# Patient Record
Sex: MALE | Race: WHITE | NOT HISPANIC OR LATINO | Employment: STUDENT | URBAN - METROPOLITAN AREA
[De-identification: names, ages, dates, MRNs, and addresses within clinical notes are randomized per-mention and may not be internally consistent; named-entity substitution may affect disease eponyms.]

---
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
## 2025-06-27 ENCOUNTER — OFFICE VISIT (OUTPATIENT)
Dept: FAMILY MEDICINE CLINIC | Facility: CLINIC | Age: 25
End: 2025-06-27
Payer: COMMERCIAL

## 2025-06-27 VITALS
BODY MASS INDEX: 26.2 KG/M2 | OXYGEN SATURATION: 98 % | TEMPERATURE: 97.7 F | WEIGHT: 176.9 LBS | HEART RATE: 99 BPM | SYSTOLIC BLOOD PRESSURE: 124 MMHG | DIASTOLIC BLOOD PRESSURE: 78 MMHG | HEIGHT: 69 IN

## 2025-06-27 DIAGNOSIS — Z00.00 ROUTINE PHYSICAL EXAMINATION: Primary | ICD-10-CM

## 2025-06-27 DIAGNOSIS — E06.3 HASHIMOTO'S THYROIDITIS: ICD-10-CM

## 2025-06-27 DIAGNOSIS — R00.2 PALPITATIONS: ICD-10-CM

## 2025-06-27 DIAGNOSIS — Z11.59 NEED FOR HEPATITIS C SCREENING TEST: ICD-10-CM

## 2025-06-27 DIAGNOSIS — Z23 NEED FOR VACCINATION: ICD-10-CM

## 2025-06-27 RX ORDER — KETOCONAZOLE 20 MG/ML
SHAMPOO, SUSPENSION TOPICAL
COMMUNITY
Start: 2025-06-13

## 2025-06-27 NOTE — PROGRESS NOTES
[facility]  Primary Care   Visit Note    Chief Complaint  Chief Complaint   Patient presents with    Rapid Heart Rate     Was seen in  on 6/18/25. Related to stress but stated this week has improved       Subjective    History of Present Illness        Ron Bhat presents to [facility] for   History of Present Illness   History of Present Illness  The patient presents to \[facility]\"" care and to follow-up from an  visit for tachycardia.    He presented to [facility] on 6/18/2025 due to his heart racing and SOA. An EKG was performed at that time that did show elevated FR of 96 but was otherwise unremarkable. No additional workup was done at that time. He was told that his symptoms were likely secondary to stress and anxiety. He does admit to increased stress in the last week due to a social event and upcoming stress at work. He works as an . He denies a h/o anxiety or depression. He has had improvement of his symptoms over the last week, but they do still occur occasionally as heart racing sensation. He denies use of caffeine, nicotine, alcohol or recreational drugs. He denies any known cardiac history. He does have a h/o hashimoto's thyroiditis and was medicated for it about 5 years ago, but he was able to manage without the medication and never followed up again after stopping his medications.     He tries to stay active with walking and golf.     SOCIAL HISTORY    No current facility-administered medications for this visit.     Review of Systems   Constitutional:  Negative for activity change, appetite change, fatigue, fever and unexpected weight change.   HENT:  Negative for hearing loss, trouble swallowing and voice change.    Eyes:  Negative for photophobia and visual disturbance.   Respiratory:  Negative for cough, chest tightness and shortness of breath.    Cardiovascular:  Positive for palpitations. Negative for chest pain and leg swelling.  "  Gastrointestinal:  Negative for abdominal pain, constipation, diarrhea, nausea and vomiting.   Genitourinary: Negative.    Musculoskeletal: Negative.    Skin: Negative.    Neurological: Negative.    Psychiatric/Behavioral:  Negative for behavioral problems, decreased concentration, dysphoric mood and sleep disturbance. The patient is nervous/anxious. The patient is not hyperactive.        Objective   Vital Signs:   Visit Vitals  /78 (BP Location: Left arm, Patient Position: Sitting, Cuff Size: Adult)   Pulse 99   Temp 97.7 °F (36.5 °C) (Temporal)   Ht 175.3 cm (69\")   Wt 80.2 kg (176 lb 14.4 oz)   SpO2 98%   BMI 26.12 kg/m²          BMI is >= 25 and <30. (Overweight) The following options were offered after discussion;: exercise counseling/recommendations and nutrition counseling/recommendations     Physical Exam   Physical Exam  Eyes: Pupils are equal, round, and reactive to light.  Mouth/Throat: Oropharynx is clear, no erythema or exudate.  Neck: No lymphadenopathy. Carotid arteries auscultated, no bruits.  Respiratory: Clear to auscultation, no wheezing, rales or rhonchi.  Cardiovascular: Heart rate is slightly elevated. Chest is mildly erythematous.  Gastrointestinal: Soft, no tenderness, no distention, no masses.  Extremities: Strength in the upper and lower extremities is normal.  Musculoskeletal: Spine examined, no tenderness.       Result Review :  Results  Diagnostic Testing   - EKG: No abnormalities                   ECG 12 Lead    Date/Time: 6/27/2025 3:19 PM  Performed by: Rebecca Veronica PA-C    Authorized by: Rebecca Veronica PA-C  Comparison: not compared with previous ECG   Previous ECG: no previous ECG available  Rhythm: sinus rhythm  Rate: normal  ST Segments: ST segments normal  T Waves: T waves normal  QRS axis: normal    Clinical impression: normal ECG              Assessment and Plan      Diagnoses and all orders for this visit:    1. Routine physical examination (Primary)  -     " CBC w AUTO Differential  -     Comprehensive metabolic panel  -     TSH  -     Lipid panel  -     Hepatitis C antibody    2. Need for hepatitis C screening test  -     Hepatitis C antibody    3. Need for vaccination  -     Tdap Vaccine => 6yo IM (BOOSTRIX/ADACEL)    4. Palpitations  -     ECG 12 Lead    5. Hashimoto's thyroiditis       Assessment & Plan  1. Anxiety.  - Reports experiencing anxiety and elevated heart rate, particularly during stressful events such as a recent wedding.  - Blood pressure today is 124/78, heart rate 99 but improved when performing EKG  - Discussed maintaining an active lifestyle and engaging in regular exercise to manage stress levels.  - Patient not interested in therapy or pharmacologic management   - Will check thyroid function today due to thyroid history     2. Thyroid disorder.  - History of thyroid disorder with no recent thyroid level checks.  - Thyroid function tests included in today's laboratory workup to rule out any thyroid-related issues contributing to symptoms.    3. Health maintenance.  - Due for a tetanus vaccine, which will be administered today.  - Hepatitis C screening will be conducted as part of routine health maintenance.  - Comprehensive bloodwork also performed  - Encouraged healthy diet and routine exercise     Follow-up  - Follow up on an annual basis or sooner if labs indicate any abnormalities           Follow Up   No follow-ups on file.  Patient was given instructions and counseling regarding his condition or for health maintenance advice. Please see specific information pulled into the AVS if appropriate.     Patient or patient representative verbalized consent for the use of Ambient Listening during the visit with  Rebecca Veronica PA-C for chart documentation. 6/27/2025  15:22 EDT    Rebecca Veronica PA-C  [address]  [phone]

## 2025-06-28 LAB
ALBUMIN SERPL-MCNC: 5.2 G/DL (ref 3.5–5.2)
ALBUMIN/GLOB SERPL: 1.6 G/DL
ALP SERPL-CCNC: 102 U/L (ref 39–117)
ALT SERPL-CCNC: 13 U/L (ref 1–41)
AST SERPL-CCNC: 16 U/L (ref 1–40)
BASOPHILS # BLD AUTO: 0.04 10*3/MM3 (ref 0–0.2)
BASOPHILS NFR BLD AUTO: 0.5 % (ref 0–1.5)
BILIRUB SERPL-MCNC: 0.8 MG/DL (ref 0–1.2)
BUN SERPL-MCNC: 6 MG/DL (ref 6–20)
BUN/CREAT SERPL: 6.2 (ref 7–25)
CALCIUM SERPL-MCNC: 10.2 MG/DL (ref 8.6–10.5)
CHLORIDE SERPL-SCNC: 100 MMOL/L (ref 98–107)
CHOLEST SERPL-MCNC: 136 MG/DL (ref 0–200)
CO2 SERPL-SCNC: 20 MMOL/L (ref 22–29)
CREAT SERPL-MCNC: 0.97 MG/DL (ref 0.76–1.27)
EGFRCR SERPLBLD CKD-EPI 2021: 111.8 ML/MIN/1.73
EOSINOPHIL # BLD AUTO: 0.02 10*3/MM3 (ref 0–0.4)
EOSINOPHIL NFR BLD AUTO: 0.3 % (ref 0.3–6.2)
ERYTHROCYTE [DISTWIDTH] IN BLOOD BY AUTOMATED COUNT: 12.5 % (ref 12.3–15.4)
GLOBULIN SER CALC-MCNC: 3.2 GM/DL
GLUCOSE SERPL-MCNC: 84 MG/DL (ref 65–99)
HCT VFR BLD AUTO: 49.9 % (ref 37.5–51)
HCV IGG SERPL QL IA: NON REACTIVE
HDLC SERPL-MCNC: 58 MG/DL (ref 40–60)
HGB BLD-MCNC: 16.3 G/DL (ref 13–17.7)
IMM GRANULOCYTES # BLD AUTO: 0.02 10*3/MM3 (ref 0–0.05)
IMM GRANULOCYTES NFR BLD AUTO: 0.3 % (ref 0–0.5)
LDLC SERPL CALC-MCNC: 62 MG/DL (ref 0–100)
LYMPHOCYTES # BLD AUTO: 1.58 10*3/MM3 (ref 0.7–3.1)
LYMPHOCYTES NFR BLD AUTO: 21.4 % (ref 19.6–45.3)
MCH RBC QN AUTO: 31.5 PG (ref 26.6–33)
MCHC RBC AUTO-ENTMCNC: 32.7 G/DL (ref 31.5–35.7)
MCV RBC AUTO: 96.5 FL (ref 79–97)
MONOCYTES # BLD AUTO: 0.52 10*3/MM3 (ref 0.1–0.9)
MONOCYTES NFR BLD AUTO: 7 % (ref 5–12)
NEUTROPHILS # BLD AUTO: 5.22 10*3/MM3 (ref 1.7–7)
NEUTROPHILS NFR BLD AUTO: 70.5 % (ref 42.7–76)
NRBC BLD AUTO-RTO: 0 /100 WBC (ref 0–0.2)
PLATELET # BLD AUTO: 332 10*3/MM3 (ref 140–450)
POTASSIUM SERPL-SCNC: 4.2 MMOL/L (ref 3.5–5.2)
PROT SERPL-MCNC: 8.4 G/DL (ref 6–8.5)
RBC # BLD AUTO: 5.17 10*6/MM3 (ref 4.14–5.8)
SODIUM SERPL-SCNC: 138 MMOL/L (ref 136–145)
TRIGL SERPL-MCNC: 86 MG/DL (ref 0–150)
TSH SERPL DL<=0.005 MIU/L-ACNC: 1.6 UIU/ML (ref 0.27–4.2)
VLDLC SERPL CALC-MCNC: 16 MG/DL (ref 5–40)
WBC # BLD AUTO: 7.4 10*3/MM3 (ref 3.4–10.8)